# Patient Record
Sex: MALE | Race: WHITE | URBAN - METROPOLITAN AREA
[De-identification: names, ages, dates, MRNs, and addresses within clinical notes are randomized per-mention and may not be internally consistent; named-entity substitution may affect disease eponyms.]

---

## 2021-01-07 ENCOUNTER — OFFICE VISIT (OUTPATIENT)
Dept: SURGERY | Age: 66
End: 2021-01-07
Payer: COMMERCIAL

## 2021-01-07 VITALS
SYSTOLIC BLOOD PRESSURE: 128 MMHG | DIASTOLIC BLOOD PRESSURE: 73 MMHG | HEART RATE: 63 BPM | BODY MASS INDEX: 26.31 KG/M2 | TEMPERATURE: 97.7 F | WEIGHT: 205 LBS | HEIGHT: 74 IN | OXYGEN SATURATION: 97 %

## 2021-01-07 DIAGNOSIS — K64.3 PROLAPSED INTERNAL HEMORRHOIDS, GRADE 4: Primary | ICD-10-CM

## 2021-01-07 PROCEDURE — 99203 OFFICE O/P NEW LOW 30 MIN: CPT | Performed by: SURGERY

## 2021-01-07 RX ORDER — MULTIVITAMIN
1 TABLET ORAL DAILY
COMMUNITY

## 2021-01-07 NOTE — PATIENT INSTRUCTIONS
Hemorrhoids: Information and Care Instructions        Hemorrhoids are enlarged veins that develop in the anal canal. They can occur with constipation, diarrhea, straining and pushing during bowel movements, pregnancy, and smoking/coughing. The tissue can get irritated and inflamed, causing bleeding, itching, swelling, and pain. Hemorrhoids can be internal or external (or occasionally both). Sometimes internal hemorrhoids can prolapse, or come out of the anus, causing difficulty keeping clean, mucus drainage, bleeding, and discomfort. External hemorrhoids are more likely to clot and cause sudden severe pain on the outside of the anus. They can be uncomfortable at times, but hemorrhoids rarely are a life-threatening problem. However, not all bleeding and pain can be blamed on hemorrhoids until a thorough examination (and sometimes a colonoscopy) is performed. The initial treatment for both internal and external hemorrhoids is the same, as below:    STOOL REGIMEN for hemorrhoids:    Stools should be soft, formed, and easy to pass consistency, not diarrhea, without the need to strain. 1) Eat a healthy diet with lots of fruits and vegetables. Exercise and be active. 2) Use a fiber supplement twice daily (Metamucil, Benefiber, Konsyl, Citrucel, generic brand, etc) per package instructions. - Women should aim for 25 grams of fiber daily.    - Men should aim for 30-35 grams of fiber daily. 3) Drink 6-8 glasses of water per day. This will work with the fiber to regulate your stools. 4) MiraLax is safe to use every day, and can be used to help lubricate and soften stool. 5) Colace stool softeners can also be used as needed. Avoid Senna and sennakot laxative products, as they may damage the colon with long-term use. Warm water sitz baths (sit in a tub filled with 3-4 inches of warm water) can be done 1-2 times daily for 5 min to help with hygiene and relaxation. DO NOT STRAIN ON THE TOILET. DO NOT READ OR PLAY CELL PHONE GAMES ON THE TOILET. Stool regimen should be trialed for 6-8 weeks before considering additional procedures or surgery. What procedures are available for internal hemorrhoids that do not respond to the above stool regimen:    · Rubber Band Ligation: This procedure is performed in the office without anesthetic. A small scope is placed into the anus and small rubber bands are placed over the hemorrhoid tissue. This causes excess tissue to fall off and scar into the rectum. This is done for patients with internal hemorrhoids only. Often this procedure needs to be repeated. Complication rates are very low. There is only minor discomfort and no down time. · Surgical excision (Hemorrhoidectomy): This surgery is performed in the operating room with sedation. The hemorrhoid tissue is cut out and wounds are stitched back together. It has low complications rates and has a 95% long term success rate. It is a painful procedure, however, and most patients require at least 2 weeks off from work/school. This may be recommended for patients with large hemorrhoids, and those who wish to have internal and external hemorrhoids addressed simultaneously, and those who desire the most definitive procedure. · Colonoscopy: This is an adjunct procedure in patients with rectal bleeding. Depending on your age and family history, colonoscopy may be recommended before pursuing hemorrhoid procedures. This is to ensure that the source of bleeding is truly hemorrhoids, and not from polyps, cancer, or inflammation located elsewhere in the colon or rectum. What about over-the-counter ointments, creams, and suppositories? Unfortunately for consumers, there is very little actual scientific data to support the use of any over-the-counter products. These usually aren't harmful to try for a few weeks, and may help with some symptoms, but all in all are a waste of money. Again, these will just merely mask the symptoms and do not actually address the underlying problem. Some of these (especially steroid-based creams), can actually cause damage to the anus and skin if used over a longer period of time (more than 3 weeks). What about external hemorrhoids? External hemorrhoids can clot or \"thrombose\". This can cause sudden onset of severe pain. Typically the clot will dissolve or push through the skin on its own within 5-7 days. However, if patients are seen within the first 1-3 days of the start of the pain, the clot and external hemorrhoid can be excised (cut) in the office, reducing the duration of pain and reducing healing time. This is typically done with local anesthetic injection. After an external hemorrhoid heals, typically there is a small skin tag that is left behind. No treatment is necessary for skin tags unless there is interference with hygiene. When should you call the office? · You have any questions or concerns. · You have excessive bleeding, fever, chills, or inability to urinate. · The office phone # is [phone]  · If you are unable to reach the office (outside of normal business hours) and you have any concerns, go to your nearest emergency room.

## 2021-01-07 NOTE — PROGRESS NOTES
[address] [address] [address]  Dept: [phone]  Dept Fax: [phone]  Loc: [phone]    Visit Date: 1/7/2021    Bill Hunter is a 63 y.o. male who presents today for: New Patient (hemorrhoids)      HPI:       Bill Hunter is a 63 y.o. male referred to me by [facility] of  for further evaluation regarding hemorrhoids. Brock Oneal tells me he has been having on and off issues with hemorrhoids for several years. He occasionally gets constipation. He is following a high-fiber diet. He has had 2 or 3 previous rubber band ligation sessions with Dr. Jackie Gutierrez. Most recently he was seen and found to have a large grade 4 hemorrhoid and so he was referred to me for further evaluation. Though he is uncomfortable, it is not excruciating. He is still urinating. He denies fever, chills, nausea, vomiting, food intolerance, weight loss, abdominal pain. Patient's problem list, medications, past medical, surgical, family, and social histories were reviewed and updated in the chart as indicated today. Past Medical History:   Diagnosis Date    A-fib ([address]     S/P ablation    Seasonal allergies     Wears glasses     for reading       Past Surgical History:   Procedure Laterality Date    ABLATION OF DYSRHYTHMIC FOCUS      a-fib    HERNIA REPAIR      age 3       Cancer-related family history is not on file. Fam Hx colon polyps    Social History:   Social History     Tobacco Use    Smoking status: Never Smoker    Smokeless tobacco: Never Used   Substance Use Topics    Alcohol use: Yes     Alcohol/week: 3.0 standard drinks     Types: 3 Cans of beer per week     Comment: monthly      Tobacco cessation counseling provided as appropriate. REVIEW OF SYSTEMS:    Pertinent positives and negatives are mentioned in the HPI above. Otherwise, all other systems were reviewed and negative.       Objective:     Physical Exam /73   Pulse 63   Temp 97.7 °F (36.5 °C) (Infrared)   Ht 6' 2\" (1.88 m)   Wt 205 lb (93 kg)   SpO2 97%   BMI 26.32 kg/m²   Constitutional: Appears well-developed and well-nourished. Grooming appropriate. No gross deformities. Body mass index is 26.32 kg/m². Eyes: No scleral icterus. Conjunctiva/lids normal. Vision intact grossly. Pupils equal/symmetric, reactive bilaterally. ENT: External ears/nose without defect, scars, or masses. Hearing grossly intact. No facial deformity. Lips normal, normal dentition. Neck: No masses. Trachea midline. No crepitus. Thyroid not enlarged. Cardiovascular: Normal rate. No peripheral edema. Abdominal aorta normal size to palpation. Pulmonary/Chest: Effort normal. No respiratory distress. No wheezes. No use of accessory muscles. Musculoskeletal: Normal range of motion x all 4 extremities and head/neck, without deformity, pain, or crepitus, with normal strength and tone. Normal gait. Nails without clubbing or cyanosis. Neurological: Alert and oriented to person, place, and time. No gross deficits. Sensation intact. Skin: Skin is dry. No rashes noted. No pallor. No induration of nodules. Psychiatric: Normal mood and affect. Behavior normal. Oriented to person, place, and time. Judgment and insight reasonable. Abdominal/wound: Soft, nontender, nondistended    Chaperone/MA present in room during entire exam. Patient was placed in the left side down position on office table. Exam table manipulated for proper visualization and lighting. Buttocks spread. Inspection reveals: Right lateral 50% circumferential edematous external hemorrhoid with concomitant grade 4 prolapsed internal hemorrhoid visible. No signs of ischemia.     Labs reviewed: none  Radiology reviewed: none    Last colonoscopy: per Dr Renny Loredo       Assessment/Plan:     A/P:  New problem(s): Grade 4 internal/external hemorrhoid  Established problem(s): Family history of colon polyps Additional workup/treatment planned: Continue with medical/conservative management, follow-up 2 to 3 weeks, hemorrhoidectomy if no improvement  Risk of complications/morbidity: Imelda SAEED appears to have a grade 4 partially thrombosed external and prolapsed internal hemorrhoid. Fortunately, there is no signs of ischemia and he is not in excruciating pain. He is uncomfortable, but he is able to deal with it. At this point we discussed the pathophysiology, etiology, natural history, work-up, treatment options for grade 4 hemorrhoids. At this point I would recommend he continue with conservative/medical management and symptomatic treatment. Discussed fiber supplementation, drinking plenty water, healthy dietary choices, avoiding straining and constipation is much as possible. Discussed sitz bath's. It will more than likely improve over the next couple weeks. We did discuss definitive management with hemorrhoidectomy if symptoms worsen, or if he develops frequent recurrences. We did discuss that sometimes after improvement of external hemorrhoids, rubber band ligation of internal hemorrhoids can be done safely. I provided written information in the After Visit Summary AVS Regarding: hemorrhoids    DISPOSITION:  F/u 2-3 wks to reassess    My findings will be relayed to consulting practitioner or PCP via Epic    Total encounter time:  30 min, including any number of the following: review of labs, imaging, provider notes, outside hospital records; performing examination/evaluation; counseling patient and family; ordering medications/tests; placing referrals and communication with referring physicians; coordination of care, and documentation in the EHR. Note completed using dictation software, please excuse any errors.     Electronically signed by Khalida Clay MD on 1/7/2021 at 10:54 AM Medium Risk

## 2021-01-07 NOTE — Clinical Note
Grade 4 partially thrombosed right lateral internal/external hemorrhoid, but less symptomatic than I thought he would be. We are just going to keep an eye on it for now, and consider hemorrhoidectomy down the road. Thanks!   Baby Shall

## 2021-01-19 ENCOUNTER — OFFICE VISIT (OUTPATIENT)
Dept: SURGERY | Age: 66
End: 2021-01-19
Payer: COMMERCIAL

## 2021-01-19 VITALS
TEMPERATURE: 97.3 F | WEIGHT: 208 LBS | SYSTOLIC BLOOD PRESSURE: 124 MMHG | OXYGEN SATURATION: 94 % | BODY MASS INDEX: 26.69 KG/M2 | DIASTOLIC BLOOD PRESSURE: 74 MMHG | HEART RATE: 73 BPM | HEIGHT: 74 IN

## 2021-01-19 DIAGNOSIS — K64.3 PROLAPSED INTERNAL HEMORRHOIDS, GRADE 4: Primary | ICD-10-CM

## 2021-01-19 PROCEDURE — 46600 DIAGNOSTIC ANOSCOPY SPX: CPT | Performed by: SURGERY

## 2021-01-19 NOTE — PROGRESS NOTES
Pertinent positives and negatives are mentioned in the HPI. Otherwise, all other systems were reviewed and negative. Objective:     Physical Exam   /74   Pulse 73   Temp 97.3 °F (36.3 °C) (Infrared)   Ht 6' 2\" (1.88 m)   Wt 208 lb (94.3 kg)   SpO2 94%   BMI 26.71 kg/m²   Constitutional: Appears well-developed and well-nourished. Grooming appropriate. No gross deformities. Body mass index is 26.71 kg/m². Eyes: No scleral icterus. Conjunctiva/lids normal. Vision intact grossly. Pupils equal/symmetric, reactive bilaterally. ENT: External ears/nose without defect, scars, or masses. Hearing grossly intact. No facial deformity. Lips normal, normal dentition. Neck: No masses. Trachea midline. No crepitus. Thyroid not enlarged. Cardiovascular: Normal rate. No peripheral edema. Abdominal aorta normal size to palpation. Pulmonary/Chest: Effort normal. No respiratory distress. No wheezes. No use of accessory muscles. Musculoskeletal: Normal range of motion of head/neck, without deformity, pain, or crepitus, with normal strength and tone. Normal gait. Nails without clubbing or cyanosis. Neurological: Alert and oriented to person, place, and time. No gross deficits. Sensation intact. Skin: Skin is dry. No rashes noted. No pallor. No induration of nodules. Psychiatric: Normal mood and affect. Behavior normal. Oriented to person, place, and time. Judgment and insight reasonable. Abdominal/wound: Soft, nontender, nondistended    During my initial anorectal exam I was unable to obtain clear visualization of the anal canal, so I determined an anoscopy would be required. I discussed with the patient and they agreed to proceed with anoscopy. ANOSCOPY:    Chaperone/MA present in room during entire exam. Patient was placed in the left side down position on office table. Exam table manipulated for proper visualization and lighting. Buttocks spread. Inspection reveals: External hemorrhoid much less prominent compared to last time    Still with some prolapse of internal hemorrhoid, less inflamed    Digital exam performed with lubricated finger revealing: no masses, induration, or tenderness. No gross blood. Normal tone. Lubricated anoscope inserted gently into anus and withdrawn for visualization of distal rectum and anal canal: Mucosa appeared normal, without masses or evidence of proctitis. Moderately-inflamed hemorrhoidal tissue still visible, but improving. No bleeding noted. Anoscope removed without difficulty. EBL: minimal. No complications. Labs reviewed: non     Imaging reviewed: non    Assessment/Plan:       A/P:  Established problem(s): Internal/external hemorrhoid, improving  Additional workup/treatment planned: Continue with routine conservative management  Risk of complications/morbidity: Moderate    At this point, his hemorrhoidal disease seems to be improving with nonoperative/conservative management. He still does have a external hemorrhoid component with grade 4 internal hemorrhoid, but much less inflamed compared to last visit. Unfortunately, it sounds like whenever he has internal hemorrhoid banding he develops a thrombosis of the external, which is not all that uncommon. For him, I would lean more towards conservative medical management. He can consider rubberbanding down the road, with continue risks of thrombosed external hemorrhoid. Discussed that for most definitive management, he would need hemorrhoidectomy, but at this point we will see how things go down the road. Further endoscopy per Dr. Mae Mccracken of GI.     DISPOSITION:  F/u PRN    My findings will be relayed to consulting practitioner or PCP via Epic note

## 2021-01-19 NOTE — Clinical Note
Prateek Garrison seems to be improving with conservative management. Discussed consideration for definitive hemorrhoidectomy down the line if symptoms recur. He could try rubber banding again, but seems to be fairly high risk for external hemorrhoid thrombosis. Thanks!   Bernard Walsh